# Patient Record
(demographics unavailable — no encounter records)

---

## 2025-04-29 NOTE — ASSESSMENT
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 Plan: 1. continue brivaracetam 200 q12 - not clear that supra-maximal dose necessary, but no side effects reported and seizures improved over past three years.  2. at discharge 4/25 - team recommended ncrease perampanel 6mg - mother very concerned about weight gain (reported in 4% of patient taking perampanel ) 3. discussed callosotomy - mother very apprehensive about surgery.  4. f/u 3 mo

## 2025-04-29 NOTE — PHYSICAL EXAM
[FreeTextEntry1] : alert and non-verbal, follows some simple spoken requests\par  EOM full without sustained nystagmus, PERRL, face symmetrical\par  Motor - full strength in all extremities\par  Coord - no tremor, ataxia\par  Gait - stands without difficulty, normal gait.

## 2025-04-29 NOTE — ASSESSMENT
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 Plan: 1. continue brivaracetam 200 q12 - not clear that supra-maximal dose necessary, but no side effects reported and seizures improved over past three years.  2. at discharge 4/25 - team recommended ncrease perampanel 6mg - mother very concerned about weight gain (reported in 4% of patient taking perampanel ) 3. discussed callosotomy - mother very apprehensive about surgery.  4. f/u 3 mo

## 2025-04-29 NOTE — HISTORY OF PRESENT ILLNESS
[FreeTextEntry1] : Mother - Ms. Kacie Daly - [phone]  *** 04/29/2025  *** RUBIO was seizure free on combination of high dose brivaracetam 200 q12 and perampanel 4 qHS, but last week had recurrent GTC, and was hosptialized, with recommendation ot increase perampanel from 4mg to 6mg.  Mother concerned that weight has increased from 2022 220lbs to 2025 280lbs due to perampanel and is unhappy with recommendation to increase dose.   === from DC Summary 4/26/25 [facility] === Neuro was consulted. Per neuro: discussed "with her mother Stephanie Galeana to discuss a number of options for AED management: 1) increase Fycompa to 6 mg, which may improve seizure control but could also increase weight gain, 2) continue current regimen as she was previously well-controlled for several years and we may not need to overreact to a single  seizure, or 3) reduce Fycompa dose in an attempt to facilitate weight loss, possibly with addition of low-dose Topamax to add more seizure control which further assisting with weight loss.  She felt that all options have downsides and was not ready to make a choice yet." Discussed with neurology Dr. Ernandez and Enresto: "okay with discharging on current meds and following up outpatient with Dr. Salinas and WOODROW Melchor." Plan to continue Briviact 200 mg BID and Fycompa 4 mg qhs on discharge.   *** 11/29/2022  ***  RUBIO mother reports that seizures have been relatively well controlled since discharge from hospital November 12.  There have been no convulsive seizures since that time.  Prior to first hospitalization in October, convulsions had been occurring up to twice a week since August.  Prior to August, convulsions had occurred once or twice per month.  Current medication regimen is brivaracetam 200 q12 and perampanel 4 mg qD.  Mother, Ms Daly, has questions about callosotomy - ALLI vs open. She is not interested in VNS at this point.   *** DC Summary Nov 12, 2022 ***  Discharge Date	12-Nov-2022  Admission Date	09-Nov-2022 20:58  Reason for Admission	Seizures in the setting of running out of brivaracetam (last dose 11/6 or 11/7)  Hospital Course	  27 year-old right-handed female w/ PMHx of intractable seizure since 2 months old, autism, T2DM, recently admitted to [facility] EMU 10/12/22-10/21/22 for breakthrough seizures, presenting to [facility] ED on 11/9/22, due to generalized tonic-clonic seizures. Pt had breakthrough seizure in the setting of no medication. Was admitted to EMU for further management.   During this hospitalization, pt was given Briviact 200 mg BID and Fycompa 4 mg qhs. vEEG remained without any signs of epileptiform seizure.   Patient is now medically stable for discharge. Patient will continue briviact 200 mg bid and fycompa 4 mg qhs.   vEEG 11/10:  EEG Summary:  Abnormal EEG in the awake, drowsy and asleep states.   Occasional generalized polyspiikes, maximum bifrontal  Background slowing, generalized, mild  Impression/Clinical Correlate:  This is an abnormal EEG record.    1. Evidence for increased risk for seizures with generalized onset.  Mild diffuse/multifocal cerebral dysfunction, not specific in etiology  No seizures were seen.   vEEG 11/11:  EEG Summary:  Abnormal EEG in the awake, drowsy and asleep states.   Occasional generalized polyspiikes, maximum bifrontal  Occasional generalized paroxysmal fast activity  Background slowing, generalized, mild  Impression/Clinical Correlate:  This is an abnormal EEG record.   Evidence for increased risk for seizures with generalized onset.  Mild diffuse/multifocal cerebral dysfunction, not specific in etiology  No seizures were seen.   vEEG 11/12:  EEG Summary:  Abnormal EEG in the awake, drowsy and asleep states.   Occasional bifrontal vs fragmented generalized sharp wave discharges  Background slowing, generalized, mild  Impression/Clinical Correlate:  This is an abnormal EEG record.    1.   Evidence for increased risk for generalized epilepsy    2. Mild diffuse/multifocal cerebral dysfunction, not specific in  etiology  No seizures were seen

## 2025-04-29 NOTE — HISTORY OF PRESENT ILLNESS
[FreeTextEntry1] : Mother - Ms. Kacie Daly - [phone]  *** 04/29/2025  *** RUBIO was seizure free on combination of high dose brivaracetam 200 q12 and perampanel 4 qHS, but last week had recurrent GTC, and was hosptialized, with recommendation ot increase perampanel from 4mg to 6mg.  Mother concerned that weight has increased from 2022 220lbs to 2025 280lbs due to perampanel and is unhappy with recommendation to increase dose.   === from DC Summary 4/26/25 [facility] === Neuro was consulted. Per neuro: discussed "with her mother Stephanie Galeana to discuss a number of options for AED management: 1) increase Fycompa to 6 mg, which may improve seizure control but could also increase weight gain, 2) continue current regimen as she was previously well-controlled for several years and we may not need to overreact to a single  seizure, or 3) reduce Fycompa dose in an attempt to facilitate weight loss, possibly with addition of low-dose Topamax to add more seizure control which further assisting with weight loss.  She felt that all options have downsides and was not ready to make a choice yet." Discussed with neurology Dr. Ernandez and Ernesto: "okay with discharging on current meds and following up outpatient with Dr. Salinas and WOODROW Melchor." Plan to continue Briviact 200 mg BID and Fycompa 4 mg qhs on discharge.   *** 11/29/2022  ***  RUBIO mother reports that seizures have been relatively well controlled since discharge from hospital November 12.  There have been no convulsive seizures since that time.  Prior to first hospitalization in October, convulsions had been occurring up to twice a week since August.  Prior to August, convulsions had occurred once or twice per month.  Current medication regimen is brivaracetam 200 q12 and perampanel 4 mg qD.  Mother, Ms Daly, has questions about callosotomy - ALLI vs open. She is not interested in VNS at this point.   *** DC Summary Nov 12, 2022 ***  Discharge Date	12-Nov-2022  Admission Date	09-Nov-2022 20:58  Reason for Admission	Seizures in the setting of running out of brivaracetam (last dose 11/6 or 11/7)  Hospital Course	  27 year-old right-handed female w/ PMHx of intractable seizure since 2 months old, autism, T2DM, recently admitted to [facility] EMU 10/12/22-10/21/22 for breakthrough seizures, presenting to [facility] ED on 11/9/22, due to generalized tonic-clonic seizures. Pt had breakthrough seizure in the setting of no medication. Was admitted to EMU for further management.   During this hospitalization, pt was given Briviact 200 mg BID and Fycompa 4 mg qhs. vEEG remained without any signs of epileptiform seizure.   Patient is now medically stable for discharge. Patient will continue briviact 200 mg bid and fycompa 4 mg qhs.   vEEG 11/10:  EEG Summary:  Abnormal EEG in the awake, drowsy and asleep states.   Occasional generalized polyspiikes, maximum bifrontal  Background slowing, generalized, mild  Impression/Clinical Correlate:  This is an abnormal EEG record.    1. Evidence for increased risk for seizures with generalized onset.  Mild diffuse/multifocal cerebral dysfunction, not specific in etiology  No seizures were seen.   vEEG 11/11:  EEG Summary:  Abnormal EEG in the awake, drowsy and asleep states.   Occasional generalized polyspiikes, maximum bifrontal  Occasional generalized paroxysmal fast activity  Background slowing, generalized, mild  Impression/Clinical Correlate:  This is an abnormal EEG record.   Evidence for increased risk for seizures with generalized onset.  Mild diffuse/multifocal cerebral dysfunction, not specific in etiology  No seizures were seen.   vEEG 11/12:  EEG Summary:  Abnormal EEG in the awake, drowsy and asleep states.   Occasional bifrontal vs fragmented generalized sharp wave discharges  Background slowing, generalized, mild  Impression/Clinical Correlate:  This is an abnormal EEG record.    1.   Evidence for increased risk for generalized epilepsy    2. Mild diffuse/multifocal cerebral dysfunction, not specific in  etiology  No seizures were seen